# Patient Record
Sex: FEMALE | Race: OTHER | HISPANIC OR LATINO | URBAN - METROPOLITAN AREA
[De-identification: names, ages, dates, MRNs, and addresses within clinical notes are randomized per-mention and may not be internally consistent; named-entity substitution may affect disease eponyms.]

---

## 2022-05-06 ENCOUNTER — OUTPATIENT (OUTPATIENT)
Dept: OUTPATIENT SERVICES | Facility: HOSPITAL | Age: 27
LOS: 1 days | End: 2022-05-06
Payer: COMMERCIAL

## 2022-05-06 VITALS
DIASTOLIC BLOOD PRESSURE: 65 MMHG | HEART RATE: 90 BPM | SYSTOLIC BLOOD PRESSURE: 115 MMHG | RESPIRATION RATE: 18 BRPM | TEMPERATURE: 98 F

## 2022-05-06 VITALS — HEART RATE: 80 BPM | DIASTOLIC BLOOD PRESSURE: 63 MMHG | SYSTOLIC BLOOD PRESSURE: 112 MMHG | OXYGEN SATURATION: 100 %

## 2022-05-06 DIAGNOSIS — O26.899 OTHER SPECIFIED PREGNANCY RELATED CONDITIONS, UNSPECIFIED TRIMESTER: ICD-10-CM

## 2022-05-06 DIAGNOSIS — Z3A.00 WEEKS OF GESTATION OF PREGNANCY NOT SPECIFIED: ICD-10-CM

## 2022-05-06 LAB
APPEARANCE UR: ABNORMAL
BACTERIA # UR AUTO: ABNORMAL
BILIRUB UR-MCNC: NEGATIVE — SIGNIFICANT CHANGE UP
COLOR SPEC: ABNORMAL
COMMENT - URINE: SIGNIFICANT CHANGE UP
DIFF PNL FLD: NEGATIVE — SIGNIFICANT CHANGE UP
EPI CELLS # UR: 12 /HPF — HIGH
GLUCOSE UR QL: NEGATIVE — SIGNIFICANT CHANGE UP
HYALINE CASTS # UR AUTO: 2 /LPF — SIGNIFICANT CHANGE UP (ref 0–2)
KETONES UR-MCNC: NEGATIVE — SIGNIFICANT CHANGE UP
LEUKOCYTE ESTERASE UR-ACNC: NEGATIVE — SIGNIFICANT CHANGE UP
NITRITE UR-MCNC: NEGATIVE — SIGNIFICANT CHANGE UP
PH UR: 7 — SIGNIFICANT CHANGE UP (ref 5–8)
PROT UR-MCNC: ABNORMAL
RBC CASTS # UR COMP ASSIST: 2 /HPF — SIGNIFICANT CHANGE UP (ref 0–4)
SP GR SPEC: 1.02 — SIGNIFICANT CHANGE UP (ref 1.01–1.02)
UROBILINOGEN FLD QL: NEGATIVE — SIGNIFICANT CHANGE UP
WBC UR QL: 5 /HPF — SIGNIFICANT CHANGE UP (ref 0–5)

## 2022-05-06 PROCEDURE — 81001 URINALYSIS AUTO W/SCOPE: CPT

## 2022-05-06 PROCEDURE — 99213 OFFICE O/P EST LOW 20 MIN: CPT

## 2022-05-06 PROCEDURE — 59025 FETAL NON-STRESS TEST: CPT

## 2022-05-06 PROCEDURE — 87086 URINE CULTURE/COLONY COUNT: CPT

## 2022-05-06 PROCEDURE — G0463: CPT

## 2022-05-06 RX ORDER — SODIUM CHLORIDE 9 MG/ML
1000 INJECTION, SOLUTION INTRAVENOUS
Refills: 0 | Status: DISCONTINUED | OUTPATIENT
Start: 2022-05-06 | End: 2022-05-20

## 2022-05-06 NOTE — OB PROVIDER TRIAGE NOTE - NSHPPHYSICALEXAM_GEN_ALL_CORE
VS:  Vital Signs Last 24 Hrs  T(C): --  T(F): --  HR: 80 (06 May 2022 15:37) (80 - 107)  BP: 112/63 (06 May 2022 15:37) (112/63 - 112/63)  BP(mean): --  RR: --  SpO2: 100% (06 May 2022 15:37) (87% - 100%  GA: NAD  Cards: RRR  Pulm: CTAB  EFH: reactive, reassuring  [place]: not xiomara  VE: 0/0/-3  Speculum: No bleeding noted in the vaginal vault and no bleeding noted from the cervical os

## 2022-05-06 NOTE — OB PROVIDER TRIAGE NOTE - HISTORY OF PRESENT ILLNESS
Pt is a 26y/o  at 23+1 who presents to triage complaining of left lower quadrant pain for one day duration. Patient also endorses concomitant occasional suprapubic spams. States that the pain is constant in nature. Denies any urinary symptoms--no increase in urinary frequency and no dysuria. Patient denies fever, chills, or any episodes of emesis. Patient denies any changes in her bowel habits and endorses eating and drinking normally over the course of the day. Patient denies any leakage of fluid. Endorses an episode of spotting one week prior.   Prenatal course c/b placental hematoma, patient endorses on episode of spotting one week ago. Patient follows with Dr. Edmond in [place] for PNC; however, due to insurance issues is no longer able to follow with him. Patient plans on establishing OBGYN when she gets her insurance figured out    OBHx:   6lb 2oz  GynHx: +remote history of abnormal paps, denies cysts, fibroids, STIs  PMHx: Sciatica  PSHx: Excess skin removal (), Spinal laminectomy L4-L5 (2020)  Med: PNV  All: NKDA  SH: Denies toxic habits x3, no depression, no anxiety

## 2022-05-06 NOTE — OB RN TRIAGE NOTE - NS_OBGYNHISTORY_OBGYN_ALL_OB_FT
x1  recurrent UTI since last delivery  Spinal Laminectomy (l4-l5)  excess abdominal skin removal from lower abd

## 2022-05-06 NOTE — OB PROVIDER TRIAGE NOTE - NSOBPROVIDERNOTE_OBGYN_ALL_OB_FT
Patient presents to triage complaining of LLQ pain for one day duration. Patient endorses that the pain is constant.     -UA normal  -FHR: Reassuring  -0/0/-3 on VE, patient unlikely to be in  labor at this time  -no concern for vaginal bleeding at this time  -patient given return precautions (Cramping, leakage of fluid, vaginal bleeding)  -patient instructed to follow up     discussed w dr jernigan (note delayed 2/2 clinical duties)  keena segovia pgy-1

## 2022-05-08 LAB
CULTURE RESULTS: SIGNIFICANT CHANGE UP
SPECIMEN SOURCE: SIGNIFICANT CHANGE UP

## 2022-08-03 ENCOUNTER — INPATIENT (INPATIENT)
Facility: HOSPITAL | Age: 27
LOS: 0 days | Discharge: HOME | DRG: 833 | End: 2022-08-04
Attending: OBSTETRICS & GYNECOLOGY | Admitting: OBSTETRICS & GYNECOLOGY
Payer: COMMERCIAL

## 2022-08-03 VITALS
TEMPERATURE: 98 F | RESPIRATION RATE: 16 BRPM | SYSTOLIC BLOOD PRESSURE: 114 MMHG | HEART RATE: 90 BPM | DIASTOLIC BLOOD PRESSURE: 78 MMHG

## 2022-08-03 DIAGNOSIS — O47.1 FALSE LABOR AT OR AFTER 37 COMPLETED WEEKS OF GESTATION: ICD-10-CM

## 2022-08-03 NOTE — OB RN TRIAGE NOTE - FALL HARM RISK - UNIVERSAL INTERVENTIONS
Bed in lowest position, wheels locked, appropriate side rails in place/Call bell, personal items and telephone in reach/Instruct patient to call for assistance before getting out of bed or chair/Non-slip footwear when patient is out of bed/[place] to call system/Physically safe environment - no spills, clutter or unnecessary equipment/Purposeful Proactive Rounding/Room/bathroom lighting operational, light cord in reach

## 2022-08-03 NOTE — OB RN TRIAGE NOTE - NS_FETALHEARTRATE_OBGYN_ALL_OB_FT
Let pt know that her ferritin level is ok at 56 therefore I would like her to take a gabapentin dose 1-2 hours prior to bedtime to see if this helps her RLS symptoms.     125

## 2022-08-04 ENCOUNTER — TRANSCRIPTION ENCOUNTER (OUTPATIENT)
Age: 27
End: 2022-08-04

## 2022-08-04 VITALS — SYSTOLIC BLOOD PRESSURE: 122 MMHG | DIASTOLIC BLOOD PRESSURE: 71 MMHG | HEART RATE: 86 BPM

## 2022-08-04 DIAGNOSIS — Z98.890 OTHER SPECIFIED POSTPROCEDURAL STATES: Chronic | ICD-10-CM

## 2022-08-04 DIAGNOSIS — O26.893 OTHER SPECIFIED PREGNANCY RELATED CONDITIONS, THIRD TRIMESTER: ICD-10-CM

## 2022-08-04 LAB
BASOPHILS # BLD AUTO: 0.02 K/UL — SIGNIFICANT CHANGE UP (ref 0–0.2)
BASOPHILS NFR BLD AUTO: 0.2 % — SIGNIFICANT CHANGE UP (ref 0–2)
BLD GP AB SCN SERPL QL: SIGNIFICANT CHANGE UP
COVID-19 SPIKE DOMAIN AB INTERP: POSITIVE
COVID-19 SPIKE DOMAIN ANTIBODY RESULT: >250 U/ML — HIGH
EOSINOPHIL # BLD AUTO: 0.01 K/UL — SIGNIFICANT CHANGE UP (ref 0–0.5)
EOSINOPHIL NFR BLD AUTO: 0.1 % — SIGNIFICANT CHANGE UP (ref 0–6)
HCT VFR BLD CALC: 34.9 % — SIGNIFICANT CHANGE UP (ref 34.5–45)
HGB BLD-MCNC: 11.3 G/DL — LOW (ref 11.5–15.5)
IMM GRANULOCYTES NFR BLD AUTO: 0.7 % — SIGNIFICANT CHANGE UP (ref 0–1.5)
LYMPHOCYTES # BLD AUTO: 2.2 K/UL — SIGNIFICANT CHANGE UP (ref 1–3.3)
LYMPHOCYTES # BLD AUTO: 25.6 % — SIGNIFICANT CHANGE UP (ref 13–44)
MCHC RBC-ENTMCNC: 27.9 PG — SIGNIFICANT CHANGE UP (ref 27–34)
MCHC RBC-ENTMCNC: 32.4 GM/DL — SIGNIFICANT CHANGE UP (ref 32–36)
MCV RBC AUTO: 86.2 FL — SIGNIFICANT CHANGE UP (ref 80–100)
MONOCYTES # BLD AUTO: 0.59 K/UL — SIGNIFICANT CHANGE UP (ref 0–0.9)
MONOCYTES NFR BLD AUTO: 6.9 % — SIGNIFICANT CHANGE UP (ref 2–14)
NEUTROPHILS # BLD AUTO: 5.72 K/UL — SIGNIFICANT CHANGE UP (ref 1.8–7.4)
NEUTROPHILS NFR BLD AUTO: 66.5 % — SIGNIFICANT CHANGE UP (ref 43–77)
PLATELET # BLD AUTO: 195 K/UL — SIGNIFICANT CHANGE UP (ref 150–400)
RBC # BLD: 4.05 M/UL — SIGNIFICANT CHANGE UP (ref 3.8–5.2)
RBC # FLD: 13.6 % — SIGNIFICANT CHANGE UP (ref 10.3–14.5)
SARS-COV-2 IGG+IGM SERPL QL IA: >250 U/ML — HIGH
SARS-COV-2 IGG+IGM SERPL QL IA: POSITIVE
SARS-COV-2 RNA SPEC QL NAA+PROBE: DETECTED
T PALLIDUM AB TITR SER: NEGATIVE — SIGNIFICANT CHANGE UP
WBC # BLD: 8.6 K/UL — SIGNIFICANT CHANGE UP (ref 3.8–10.5)
WBC # FLD AUTO: 8.6 K/UL — SIGNIFICANT CHANGE UP (ref 3.8–10.5)

## 2022-08-04 PROCEDURE — 99213 OFFICE O/P EST LOW 20 MIN: CPT

## 2022-08-04 RX ORDER — CITRIC ACID/SODIUM CITRATE 300-500 MG
30 SOLUTION, ORAL ORAL ONCE
Refills: 0 | Status: COMPLETED | OUTPATIENT
Start: 2022-08-04 | End: 2022-08-04

## 2022-08-04 RX ORDER — SODIUM CHLORIDE 9 MG/ML
1000 INJECTION, SOLUTION INTRAVENOUS
Refills: 0 | Status: DISCONTINUED | OUTPATIENT
Start: 2022-08-04 | End: 2022-08-04

## 2022-08-04 RX ORDER — FAMOTIDINE 10 MG/ML
20 INJECTION INTRAVENOUS ONCE
Refills: 0 | Status: COMPLETED | OUTPATIENT
Start: 2022-08-04 | End: 2022-08-04

## 2022-08-04 RX ORDER — AMPICILLIN TRIHYDRATE 250 MG
1 CAPSULE ORAL EVERY 4 HOURS
Refills: 0 | Status: DISCONTINUED | OUTPATIENT
Start: 2022-08-04 | End: 2022-08-04

## 2022-08-04 RX ORDER — AMPICILLIN TRIHYDRATE 250 MG
2 CAPSULE ORAL ONCE
Refills: 0 | Status: COMPLETED | OUTPATIENT
Start: 2022-08-04 | End: 2022-08-04

## 2022-08-04 RX ORDER — CHLORHEXIDINE GLUCONATE 213 G/1000ML
1 SOLUTION TOPICAL ONCE
Refills: 0 | Status: DISCONTINUED | OUTPATIENT
Start: 2022-08-04 | End: 2022-08-04

## 2022-08-04 RX ORDER — OXYTOCIN 10 UNIT/ML
333.33 VIAL (ML) INJECTION
Qty: 20 | Refills: 0 | Status: DISCONTINUED | OUTPATIENT
Start: 2022-08-04 | End: 2022-08-04

## 2022-08-04 RX ADMIN — FAMOTIDINE 20 MILLIGRAM(S): 10 INJECTION INTRAVENOUS at 06:01

## 2022-08-04 RX ADMIN — Medication 30 MILLILITER(S): at 03:49

## 2022-08-04 RX ADMIN — Medication 200 GRAM(S): at 04:35

## 2022-08-04 RX ADMIN — SODIUM CHLORIDE 125 MILLILITER(S): 9 INJECTION, SOLUTION INTRAVENOUS at 06:01

## 2022-08-04 NOTE — DISCHARGE NOTE ANTEPARTUM - PLAN OF CARE
Follow-up with your doctor in 1 week.   If experiencing LOF, heavy vaginal bleeding, decreased fetal movement or painful contractions less than 5 mins apart- lasting 1 hour, call you provider immediately or return to the hospital.

## 2022-08-04 NOTE — OB PROVIDER LABOR PROGRESS NOTE - ASSESSMENT
Cat 1 tracing   No significant cervical change appreciated   Will reassess in 2 hours  Cat 1 tracing   No significant cervical change appreciated   Will reassess in 2 hours     ATTENDING ADDENDUM:  Pt is a 26y/o  @ 36wks (YUN 2022)  pre-term contraction  -exam 2 cm unchanged per pgy 3 over many hours  -patient seen and appears very comfortable  -FWB Cat 1 reactive  -[place]: irregular  -next PNC visit tomorrow    d/c home w/ precautions    Dr. Gao  Covering Laborist

## 2022-08-04 NOTE — OB PROVIDER LABOR PROGRESS NOTE - NS_OBIHIFHRDETAILS_OBGYN_ALL_OB_FT
120 baseline, moderate variability, +accels, no decels
130 baseline, moderate variability, +accels, no decels

## 2022-08-04 NOTE — OB PROVIDER H&P - NSHPPHYSICALEXAM_GEN_ALL_CORE
ICU Vital Signs Last 24 Hrs  T(C): 36.8 (03 Aug 2022 23:53), Max: 36.8 (03 Aug 2022 23:53)  T(F): 98.2 (03 Aug 2022 23:53), Max: 98.2 (03 Aug 2022 23:53)  HR: 90 (04 Aug 2022 00:02) (90 - 90)  BP: 114/78 (04 Aug 2022 00:02) (114/78 - 114/78)  RR: 16 (03 Aug 2022 23:53) (16 - 16)      PE  GEN: Moderate discomfort w/ contraction pain  ABD: Firm during contractions, gravid  SVE: 2.0/50/-3  FHT: 120 bpm, moderate variability + accels no decels present   [place]: CTX q3min

## 2022-08-04 NOTE — OB PROVIDER LABOR PROGRESS NOTE - NS_SUBJECTIVE/OBJECTIVE_OBGYN_ALL_OB_FT
Pt examined at bedside. Reports last felt a contraction at 7:30, ctx at most painful are 7/10, but irregular.
Pt  reevaluated for cervical change

## 2022-08-04 NOTE — OB PROVIDER H&P - ASSESSMENT
Patient Name: Gadiel Benavides        : 9/15/1955       Medical Record #: [id number]    CONSENT FOR PROCEDURES/SEDATION    Date: 2020       Time: 2:01 PM        1.  I authorize the performance upon Gadiel Benavides the following:    Esophago Pt is a 26y/o  @ 36wks (YUN 2022) who presents to L&D w/ complaints of regular ctx pain; admin for  labor    PLAN:  - Admit to L&D  - Consent  - Admission labs  - GBS unknown. Culture obtained at bedside. Will start ampicillin ppx  - COVID-19 pending; asymptomatic at bedside  - Hx of  contractions, betamethasone complete @ 26wks.   - Reactive tracing, regular ctx at bedside  - Will c/w expectant mgmt. Pt does not desire pain meds at this time.   - Continuous toco/FHT  - Maternal/fetal status reassuring      D/w Dr. Dunbar

## 2022-08-04 NOTE — DISCHARGE NOTE ANTEPARTUM - HOSPITAL COURSE
Pt reevaluated for cervical change in the setting of  contractions. Exam remains unchanged. NST reactive. Stable for d/c home with follow-up with her provider with labor return precautions.

## 2022-08-04 NOTE — DISCHARGE NOTE ANTEPARTUM - PATIENT PORTAL LINK FT
You can access the FollowMyHealth Patient Portal offered by [facility] by registering at the following website: [email or web address]. By joining [employer]’s FollowMyHealth portal, you will also be able to view your health information using other applications (apps) compatible with our system.

## 2022-08-04 NOTE — OB PROVIDER H&P - HISTORY OF PRESENT ILLNESS
Pt is a 26y/o  @ 36wks (YUN 2022) who presents to L&D w/ complaints of regular ctx pain.    Patient states contractions began last night and are now occurring q5min. Reports 8/10 contraction like pain. Reports scant vaginal spotting. Otherwise reports good fetal movement. Denies any LOF or dysuria.     Pregnancy course complicated by:  -  contractions at 26wks, Pt hospitalized at [facility] @ 26wks - Received betamethasone x2 and MgSO4. Made no cervical change and discharged home.     OBHx:  (2016) - Female- 6lb 2oz  GynHx: +remote history of abnormal paps, denies cysts, fibroids, STIs  PMHx: Sciatica  PSHx: Excess skin removal (), Spinal laminectomy L4-L5 ()  Med: PNV  All: NKDA  SH: Denies toxic habits x3, no depression, no anxiety

## 2022-08-04 NOTE — DISCHARGE NOTE ANTEPARTUM - CARE PLAN
1 Principal Discharge DX:	Labor, false ([place]app), antepartum  Assessment and plan of treatment:	Follow-up with your doctor in 1 week.   If experiencing LOF, heavy vaginal bleeding, decreased fetal movement or painful contractions less than 5 mins apart- lasting 1 hour, call you provider immediately or return to the hospital.

## 2022-08-04 NOTE — OB PROVIDER LABOR PROGRESS NOTE - ASSESSMENT
Cat 1 tracing   No cervical change despite persistent contractions.   Pt not in active labor. Given GA- unable to augment.   Will d/c home. Labor precautions reviewed- pt expressed understanding. Pt's questions addressed

## 2022-08-05 LAB
GROUP B BETA STREP DNA (PCR): DETECTED
GROUP B BETA STREP INTERPRETATION: SIGNIFICANT CHANGE UP
SOURCE GROUP B STREP: SIGNIFICANT CHANGE UP

## 2022-08-06 PROCEDURE — 36415 COLL VENOUS BLD VENIPUNCTURE: CPT

## 2022-08-06 PROCEDURE — U0003: CPT

## 2022-08-06 PROCEDURE — 87653 STREP B DNA AMP PROBE: CPT

## 2022-08-06 PROCEDURE — 86780 TREPONEMA PALLIDUM: CPT

## 2022-08-06 PROCEDURE — 85025 COMPLETE CBC W/AUTO DIFF WBC: CPT

## 2022-08-06 PROCEDURE — 86769 SARS-COV-2 COVID-19 ANTIBODY: CPT

## 2022-08-06 PROCEDURE — 86850 RBC ANTIBODY SCREEN: CPT

## 2022-08-06 PROCEDURE — U0005: CPT

## 2022-08-06 PROCEDURE — 86900 BLOOD TYPING SEROLOGIC ABO: CPT

## 2022-08-06 PROCEDURE — 86901 BLOOD TYPING SEROLOGIC RH(D): CPT

## 2022-08-12 ENCOUNTER — TRANSCRIPTION ENCOUNTER (OUTPATIENT)
Age: 27
End: 2022-08-12

## 2022-08-12 ENCOUNTER — INPATIENT (INPATIENT)
Facility: HOSPITAL | Age: 27
LOS: 0 days | Discharge: HOME | End: 2022-08-13
Attending: OBSTETRICS & GYNECOLOGY | Admitting: OBSTETRICS & GYNECOLOGY
Payer: COMMERCIAL

## 2022-08-12 VITALS
DIASTOLIC BLOOD PRESSURE: 88 MMHG | TEMPERATURE: 98 F | SYSTOLIC BLOOD PRESSURE: 132 MMHG | RESPIRATION RATE: 17 BRPM | HEART RATE: 107 BPM

## 2022-08-12 DIAGNOSIS — Z98.890 OTHER SPECIFIED POSTPROCEDURAL STATES: Chronic | ICD-10-CM

## 2022-08-12 DIAGNOSIS — O47.1 FALSE LABOR AT OR AFTER 37 COMPLETED WEEKS OF GESTATION: ICD-10-CM

## 2022-08-12 LAB
BASOPHILS # BLD AUTO: 0.03 K/UL — SIGNIFICANT CHANGE UP (ref 0–0.2)
BASOPHILS NFR BLD AUTO: 0.3 % — SIGNIFICANT CHANGE UP (ref 0–2)
BLD GP AB SCN SERPL QL: SIGNIFICANT CHANGE UP
COVID-19 SPIKE DOMAIN AB INTERP: POSITIVE
COVID-19 SPIKE DOMAIN ANTIBODY RESULT: >250 U/ML — HIGH
EOSINOPHIL # BLD AUTO: 0.04 K/UL — SIGNIFICANT CHANGE UP (ref 0–0.5)
EOSINOPHIL NFR BLD AUTO: 0.4 % — SIGNIFICANT CHANGE UP (ref 0–6)
HBV SURFACE AG SERPL QL IA: SIGNIFICANT CHANGE UP
HCT VFR BLD CALC: 35.3 % — SIGNIFICANT CHANGE UP (ref 34.5–45)
HGB BLD-MCNC: 11.5 G/DL — SIGNIFICANT CHANGE UP (ref 11.5–15.5)
HIV 1 & 2 AB SERPL IA.RAPID: SIGNIFICANT CHANGE UP
HIV 1+2 AB+HIV1 P24 AG SERPL QL IA: SIGNIFICANT CHANGE UP
IMM GRANULOCYTES NFR BLD AUTO: 0.6 % — SIGNIFICANT CHANGE UP (ref 0–1.5)
LYMPHOCYTES # BLD AUTO: 1.91 K/UL — SIGNIFICANT CHANGE UP (ref 1–3.3)
LYMPHOCYTES # BLD AUTO: 17.4 % — SIGNIFICANT CHANGE UP (ref 13–44)
MCHC RBC-ENTMCNC: 28.7 PG — SIGNIFICANT CHANGE UP (ref 27–34)
MCHC RBC-ENTMCNC: 32.6 GM/DL — SIGNIFICANT CHANGE UP (ref 32–36)
MCV RBC AUTO: 88 FL — SIGNIFICANT CHANGE UP (ref 80–100)
MONOCYTES # BLD AUTO: 0.85 K/UL — SIGNIFICANT CHANGE UP (ref 0–0.9)
MONOCYTES NFR BLD AUTO: 7.8 % — SIGNIFICANT CHANGE UP (ref 2–14)
NEUTROPHILS # BLD AUTO: 8.05 K/UL — HIGH (ref 1.8–7.4)
NEUTROPHILS NFR BLD AUTO: 73.5 % — SIGNIFICANT CHANGE UP (ref 43–77)
PLATELET # BLD AUTO: 230 K/UL — SIGNIFICANT CHANGE UP (ref 150–400)
RBC # BLD: 4.01 M/UL — SIGNIFICANT CHANGE UP (ref 3.8–5.2)
RBC # FLD: 14.3 % — SIGNIFICANT CHANGE UP (ref 10.3–14.5)
SARS-COV-2 IGG+IGM SERPL QL IA: >250 U/ML — HIGH
SARS-COV-2 IGG+IGM SERPL QL IA: POSITIVE
T PALLIDUM AB TITR SER: NEGATIVE — SIGNIFICANT CHANGE UP
WBC # BLD: 10.95 K/UL — HIGH (ref 3.8–10.5)
WBC # FLD AUTO: 10.95 K/UL — HIGH (ref 3.8–10.5)

## 2022-08-12 RX ORDER — CITRIC ACID/SODIUM CITRATE 300-500 MG
15 SOLUTION, ORAL ORAL EVERY 6 HOURS
Refills: 0 | Status: DISCONTINUED | OUTPATIENT
Start: 2022-08-12 | End: 2022-08-12

## 2022-08-12 RX ORDER — PRAMOXINE HYDROCHLORIDE 150 MG/15G
1 AEROSOL, FOAM RECTAL EVERY 4 HOURS
Refills: 0 | Status: DISCONTINUED | OUTPATIENT
Start: 2022-08-12 | End: 2022-08-13

## 2022-08-12 RX ORDER — LANOLIN
1 OINTMENT (GRAM) TOPICAL EVERY 6 HOURS
Refills: 0 | Status: DISCONTINUED | OUTPATIENT
Start: 2022-08-12 | End: 2022-08-13

## 2022-08-12 RX ORDER — OXYTOCIN 10 UNIT/ML
VIAL (ML) INJECTION
Qty: 30 | Refills: 0 | Status: DISCONTINUED | OUTPATIENT
Start: 2022-08-12 | End: 2022-08-12

## 2022-08-12 RX ORDER — KETOROLAC TROMETHAMINE 30 MG/ML
30 SYRINGE (ML) INJECTION ONCE
Refills: 0 | Status: DISCONTINUED | OUTPATIENT
Start: 2022-08-12 | End: 2022-08-12

## 2022-08-12 RX ORDER — CHLORHEXIDINE GLUCONATE 213 G/1000ML
1 SOLUTION TOPICAL ONCE
Refills: 0 | Status: DISCONTINUED | OUTPATIENT
Start: 2022-08-12 | End: 2022-08-12

## 2022-08-12 RX ORDER — AER TRAVELER 0.5 G/1
1 SOLUTION RECTAL; TOPICAL EVERY 4 HOURS
Refills: 0 | Status: DISCONTINUED | OUTPATIENT
Start: 2022-08-12 | End: 2022-08-13

## 2022-08-12 RX ORDER — IBUPROFEN 200 MG
600 TABLET ORAL EVERY 6 HOURS
Refills: 0 | Status: COMPLETED | OUTPATIENT
Start: 2022-08-12 | End: 2023-07-11

## 2022-08-12 RX ORDER — BENZOCAINE 10 %
1 GEL (GRAM) MUCOUS MEMBRANE EVERY 6 HOURS
Refills: 0 | Status: DISCONTINUED | OUTPATIENT
Start: 2022-08-12 | End: 2022-08-13

## 2022-08-12 RX ORDER — OXYCODONE HYDROCHLORIDE 5 MG/1
5 TABLET ORAL
Refills: 0 | Status: DISCONTINUED | OUTPATIENT
Start: 2022-08-12 | End: 2022-08-13

## 2022-08-12 RX ORDER — HYDROCORTISONE 1 %
1 OINTMENT (GRAM) TOPICAL EVERY 6 HOURS
Refills: 0 | Status: DISCONTINUED | OUTPATIENT
Start: 2022-08-12 | End: 2022-08-13

## 2022-08-12 RX ORDER — TETANUS TOXOID, REDUCED DIPHTHERIA TOXOID AND ACELLULAR PERTUSSIS VACCINE, ADSORBED 5; 2.5; 8; 8; 2.5 [IU]/.5ML; [IU]/.5ML; UG/.5ML; UG/.5ML; UG/.5ML
0.5 SUSPENSION INTRAMUSCULAR ONCE
Refills: 0 | Status: DISCONTINUED | OUTPATIENT
Start: 2022-08-12 | End: 2022-08-13

## 2022-08-12 RX ORDER — ACETAMINOPHEN 500 MG
975 TABLET ORAL
Refills: 0 | Status: DISCONTINUED | OUTPATIENT
Start: 2022-08-12 | End: 2022-08-13

## 2022-08-12 RX ORDER — SODIUM CHLORIDE 9 MG/ML
1000 INJECTION, SOLUTION INTRAVENOUS ONCE
Refills: 0 | Status: COMPLETED | OUTPATIENT
Start: 2022-08-12 | End: 2022-08-12

## 2022-08-12 RX ORDER — DIBUCAINE 1 %
1 OINTMENT (GRAM) RECTAL EVERY 6 HOURS
Refills: 0 | Status: DISCONTINUED | OUTPATIENT
Start: 2022-08-12 | End: 2022-08-13

## 2022-08-12 RX ORDER — FAMOTIDINE 10 MG/ML
20 INJECTION INTRAVENOUS ONCE
Refills: 0 | Status: COMPLETED | OUTPATIENT
Start: 2022-08-12 | End: 2022-08-12

## 2022-08-12 RX ORDER — SODIUM CHLORIDE 9 MG/ML
1000 INJECTION, SOLUTION INTRAVENOUS
Refills: 0 | Status: DISCONTINUED | OUTPATIENT
Start: 2022-08-12 | End: 2022-08-12

## 2022-08-12 RX ORDER — OXYTOCIN 10 UNIT/ML
333.33 VIAL (ML) INJECTION
Qty: 20 | Refills: 0 | Status: COMPLETED | OUTPATIENT
Start: 2022-08-12 | End: 2022-08-12

## 2022-08-12 RX ORDER — SODIUM CHLORIDE 9 MG/ML
3 INJECTION INTRAMUSCULAR; INTRAVENOUS; SUBCUTANEOUS EVERY 8 HOURS
Refills: 0 | Status: DISCONTINUED | OUTPATIENT
Start: 2022-08-12 | End: 2022-08-13

## 2022-08-12 RX ORDER — MAGNESIUM HYDROXIDE 400 MG/1
30 TABLET, CHEWABLE ORAL
Refills: 0 | Status: DISCONTINUED | OUTPATIENT
Start: 2022-08-12 | End: 2022-08-13

## 2022-08-12 RX ORDER — DIPHENHYDRAMINE HCL 50 MG
25 CAPSULE ORAL EVERY 6 HOURS
Refills: 0 | Status: DISCONTINUED | OUTPATIENT
Start: 2022-08-12 | End: 2022-08-13

## 2022-08-12 RX ORDER — SIMETHICONE 80 MG/1
80 TABLET, CHEWABLE ORAL EVERY 4 HOURS
Refills: 0 | Status: DISCONTINUED | OUTPATIENT
Start: 2022-08-12 | End: 2022-08-13

## 2022-08-12 RX ORDER — CITRIC ACID/SODIUM CITRATE 300-500 MG
30 SOLUTION, ORAL ORAL ONCE
Refills: 0 | Status: DISCONTINUED | OUTPATIENT
Start: 2022-08-12 | End: 2022-08-13

## 2022-08-12 RX ORDER — AMPICILLIN TRIHYDRATE 250 MG
2 CAPSULE ORAL ONCE
Refills: 0 | Status: COMPLETED | OUTPATIENT
Start: 2022-08-12 | End: 2022-08-12

## 2022-08-12 RX ORDER — OXYCODONE HYDROCHLORIDE 5 MG/1
5 TABLET ORAL ONCE
Refills: 0 | Status: DISCONTINUED | OUTPATIENT
Start: 2022-08-12 | End: 2022-08-13

## 2022-08-12 RX ORDER — AMPICILLIN TRIHYDRATE 250 MG
1 CAPSULE ORAL EVERY 4 HOURS
Refills: 0 | Status: DISCONTINUED | OUTPATIENT
Start: 2022-08-12 | End: 2022-08-12

## 2022-08-12 RX ORDER — OXYTOCIN 10 UNIT/ML
333.33 VIAL (ML) INJECTION
Qty: 20 | Refills: 0 | Status: DISCONTINUED | OUTPATIENT
Start: 2022-08-12 | End: 2022-08-13

## 2022-08-12 RX ORDER — IBUPROFEN 200 MG
600 TABLET ORAL EVERY 6 HOURS
Refills: 0 | Status: DISCONTINUED | OUTPATIENT
Start: 2022-08-12 | End: 2022-08-13

## 2022-08-12 RX ADMIN — Medication 30 MILLIGRAM(S): at 17:17

## 2022-08-12 RX ADMIN — SODIUM CHLORIDE 125 MILLILITER(S): 9 INJECTION, SOLUTION INTRAVENOUS at 12:45

## 2022-08-12 RX ADMIN — Medication 200 GRAM(S): at 09:20

## 2022-08-12 RX ADMIN — FAMOTIDINE 20 MILLIGRAM(S): 10 INJECTION INTRAVENOUS at 16:12

## 2022-08-12 RX ADMIN — Medication 2 MILLIUNIT(S)/MIN: at 12:54

## 2022-08-12 RX ADMIN — Medication 1000 MILLIUNIT(S)/MIN: at 16:23

## 2022-08-12 RX ADMIN — SODIUM CHLORIDE 1000 MILLILITER(S): 9 INJECTION, SOLUTION INTRAVENOUS at 13:53

## 2022-08-12 RX ADMIN — Medication 975 MILLIGRAM(S): at 21:27

## 2022-08-12 RX ADMIN — SODIUM CHLORIDE 3 MILLILITER(S): 9 INJECTION INTRAMUSCULAR; INTRAVENOUS; SUBCUTANEOUS at 15:03

## 2022-08-12 RX ADMIN — Medication 108 GRAM(S): at 13:14

## 2022-08-12 NOTE — OB RN PATIENT PROFILE - FALL HARM RISK - UNIVERSAL INTERVENTIONS
Bed in lowest position, wheels locked, appropriate side rails in place/Call bell, personal items and telephone in reach/Instruct patient to call for assistance before getting out of bed or chair/Non-slip footwear when patient is out of bed/[place] to call system/Physically safe environment - no spills, clutter or unnecessary equipment/Purposeful Proactive Rounding/Room/bathroom lighting operational, light cord in reach

## 2022-08-12 NOTE — OB RN DELIVERY SUMMARY - NSSELHIDDEN_OBGYN_ALL_OB_FT
[NS_DeliveryAttending1_OBGYN_ALL_OB_FT:[id number]=],[NS_DeliveryAssist1_OBGYN_ALL_OB_FT:[id number]=],[NS_DeliveryRN_OBGYN_ALL_OB_FT:[id number]=]

## 2022-08-12 NOTE — OB PROVIDER DELIVERY SUMMARY - NSPROVIDERDELIVERYNOTE_OBGYN_ALL_OB_FT
at 4:23pm of a live female, and Apgars 9/9. Delivered ANGELIA, nuchal cord x1, clear fluid. Infant's head delivered with maternal expulsive efforts. Shoulders delivered without difficulty followed by the rest of the body. Nose and mouth were bulb suctioned. Cord clamped and cut after delay. Samples obtained. Baby handed to patient. Placenta delivered spontaneously, intact, 3VC. Fundus firm, minimal bleeding. Perineum and vagina inspected – small bilateral labial lacerations repaired with chromic. EBL 58cc. Hemostasis noted. Pt tolerated procedure well, in stable condition, recovering in LDR. Infant in LDR. Instrument/sponge count correct x 2 and confirmed by nurse.

## 2022-08-12 NOTE — OB PROVIDER LABOR PROGRESS NOTE - NS_SUBJECTIVE/OBJECTIVE_OBGYN_ALL_OB_FT
Patient re-examined at bedside. Reports feeling contractions every 10 minutes.
Patient re-examined at bedside. Reports feeling increased pain with contractions. Requesting an epidural.

## 2022-08-12 NOTE — OB PROVIDER LABOR PROGRESS NOTE - ASSESSMENT
-VSS  -giving IVF bolus in preparation for epidural  -Will continue augmentation with Pitocin, currently at 4  -will re-assess after epidural is placed
-VSS  -Will start augmenting labor with Pitocin  -Will re-asses as needed

## 2022-08-12 NOTE — OB RN DELIVERY SUMMARY - NS_SEPSISRSKCALC_OBGYN_ALL_OB_FT
EOS calculated successfully. EOS Risk Factor: 0.5/1000 live births ([facility] national incidence); GA=37w1d; Temp=98.24; ROM=11.383; GBS='Positive'; Antibiotics='GBS specific antibiotics > 2 hrs prior to birth'

## 2022-08-12 NOTE — OB PROVIDER DELIVERY SUMMARY - NSSELHIDDEN_OBGYN_ALL_OB_FT
[NS_DeliveryAttending1_OBGYN_ALL_OB_FT:[id number]],[NS_DeliveryAssist1_OBGYN_ALL_OB_FT:[id number]=]

## 2022-08-12 NOTE — OB RN DELIVERY SUMMARY - NS_LABORCHARACTER_OBGYN_ALL_OB
External electronic FM/Antibiotics in labor Augmentation of labor/External electronic FM/Antibiotics in labor

## 2022-08-12 NOTE — OB PROVIDER H&P - NS_OBGYNHISTORY_OBGYN_ALL_OB_FT
NSVDx1  hospitalized at [facility] for PTL during this pregnancy, she received BMZ

## 2022-08-13 VITALS
HEART RATE: 84 BPM | OXYGEN SATURATION: 98 % | DIASTOLIC BLOOD PRESSURE: 67 MMHG | SYSTOLIC BLOOD PRESSURE: 103 MMHG | TEMPERATURE: 98 F | RESPIRATION RATE: 18 BRPM

## 2022-08-13 LAB
HCT VFR BLD CALC: 36.8 % — SIGNIFICANT CHANGE UP (ref 34.5–45)
HGB BLD-MCNC: 11.9 G/DL — SIGNIFICANT CHANGE UP (ref 11.5–15.5)
MEV IGG SER-ACNC: 35.1 AU/ML — SIGNIFICANT CHANGE UP
MEV IGG+IGM SER-IMP: POSITIVE — SIGNIFICANT CHANGE UP
RUBV IGG SER-ACNC: 0.6 INDEX — SIGNIFICANT CHANGE UP
RUBV IGG SER-IMP: NEGATIVE — SIGNIFICANT CHANGE UP

## 2022-08-13 PROCEDURE — 86780 TREPONEMA PALLIDUM: CPT

## 2022-08-13 PROCEDURE — 85018 HEMOGLOBIN: CPT

## 2022-08-13 PROCEDURE — 86703 HIV-1/HIV-2 1 RESULT ANTBDY: CPT

## 2022-08-13 PROCEDURE — 85025 COMPLETE CBC W/AUTO DIFF WBC: CPT

## 2022-08-13 PROCEDURE — 86762 RUBELLA ANTIBODY: CPT

## 2022-08-13 PROCEDURE — 86769 SARS-COV-2 COVID-19 ANTIBODY: CPT

## 2022-08-13 PROCEDURE — 86765 RUBEOLA ANTIBODY: CPT

## 2022-08-13 PROCEDURE — 87340 HEPATITIS B SURFACE AG IA: CPT

## 2022-08-13 PROCEDURE — 86900 BLOOD TYPING SEROLOGIC ABO: CPT

## 2022-08-13 PROCEDURE — 59050 FETAL MONITOR W/REPORT: CPT

## 2022-08-13 PROCEDURE — 86850 RBC ANTIBODY SCREEN: CPT

## 2022-08-13 PROCEDURE — 59025 FETAL NON-STRESS TEST: CPT

## 2022-08-13 PROCEDURE — 86901 BLOOD TYPING SEROLOGIC RH(D): CPT

## 2022-08-13 PROCEDURE — 87389 HIV-1 AG W/HIV-1&-2 AB AG IA: CPT

## 2022-08-13 PROCEDURE — 83986 ASSAY PH BODY FLUID NOS: CPT

## 2022-08-13 PROCEDURE — G0463: CPT

## 2022-08-13 PROCEDURE — 36415 COLL VENOUS BLD VENIPUNCTURE: CPT

## 2022-08-13 PROCEDURE — 85014 HEMATOCRIT: CPT

## 2022-08-13 RX ORDER — IBUPROFEN 200 MG
1 TABLET ORAL
Qty: 30 | Refills: 0
Start: 2022-08-13

## 2022-08-13 RX ORDER — ACETAMINOPHEN 500 MG
1 TABLET ORAL
Qty: 30 | Refills: 0
Start: 2022-08-13 | End: 2022-08-22

## 2022-08-13 RX ADMIN — Medication 975 MILLIGRAM(S): at 03:32

## 2022-08-13 RX ADMIN — SODIUM CHLORIDE 3 MILLILITER(S): 9 INJECTION INTRAMUSCULAR; INTRAVENOUS; SUBCUTANEOUS at 02:04

## 2022-08-13 RX ADMIN — Medication 975 MILLIGRAM(S): at 09:01

## 2022-08-13 RX ADMIN — Medication 975 MILLIGRAM(S): at 14:17

## 2022-08-13 RX ADMIN — Medication 600 MILLIGRAM(S): at 00:42

## 2022-08-13 RX ADMIN — Medication 1 TABLET(S): at 14:18

## 2022-08-13 RX ADMIN — Medication 600 MILLIGRAM(S): at 05:36

## 2022-08-13 NOTE — PROGRESS NOTE ADULT - SUBJECTIVE AND OBJECTIVE BOX
LUL MUÑIZ is a 27y  now post partum day 1 s/p uncomplicated  @ 37w1d for labor; asymptomatic COV+    SUBJECTIVE:  No acute events overnight, patient has no complaints.  Pain is well controlled with current treatment regimen.  Ambulating and tolerating PO.   + flatus, - BM, + voiding.  Appropriate lochia, which is decreasing.  Breastfeeding without difficulty.  Denies fever, chills, nausea, vomiting, cough, sore throat.  She denies lightheadedness, dizziness, HA, blurry vision, palpitations, chest pain and SOB.     OBJECTIVE:  Physical exam:  General: AOx3, NAD.  Heart: RRR  Lungs: CTAB  Abdomen: Soft, appropriately tender to palpitation, uterus firm with fundus @ umbilicus.  Vaginal: minimal blood on pad, no bleeding on palpation of fundus  Ext: No DVT signs, warm extremities.    Vital Signs Last 24 Hrs  T(C): 36.8 (12 Aug 2022 18:27), Max: 36.8 (12 Aug 2022 07:36)  T(F): 98.3 (12 Aug 2022 18:27), Max: 98.3 (12 Aug 2022 18:27)  HR: 81 (12 Aug 2022 18:27) (73 - 126)  BP: 113/75 (12 Aug 2022 18:27) (100/61 - 155/71)  RR: 18 (12 Aug 2022 18:27) (17 - 18)  SpO2: 98% (12 Aug 2022 18:27) (94% - 100%)        LABS:                        11.5   10.95 )-----------( 230      ( 12 Aug 2022 09:30 )             35.3

## 2022-08-13 NOTE — DISCHARGE NOTE OB - NS MD DC FALL RISK RISK
For information on Fall & Injury Prevention, visit: [email or web address] OR  [email or web address] OR  https://www.cdc.gov/steadi/patient.html

## 2022-08-13 NOTE — DISCHARGE NOTE OB - CARE PLAN
Principal Discharge DX:	 (normal spontaneous vaginal delivery)  Assessment and plan of treatment:	Patient should transition to regular activity level. Resume regular diet. Patient should follow up with her OB for a postpartum checkup 4-6 weeks after delivery. Patient should call her doctor sooner if she develops a fever or uncontrolled vaginal bleeding. Please call sooner if there are any other concerns.  Secondary Diagnosis:	COVID-19 affecting pregnancy in third trimester  Assessment and plan of treatment:	Asymptomatic   1

## 2022-08-13 NOTE — DISCHARGE NOTE OB - CARE PROVIDER_API CALL
Bright White)  Obstetrics and Gynecology  [address], [address]  [place] [address]  Phone: [phone]  Fax: [phone]  Follow Up Time:

## 2022-08-13 NOTE — DISCHARGE NOTE OB - PATIENT PORTAL LINK FT
You can access the FollowMyHealth Patient Portal offered by [facility] by registering at the following website: [email or web address]. By joining "[employer]’s FollowMyHealth portal, you will also be able to view your health information using other applications (apps) compatible with our system.

## 2022-08-13 NOTE — PROGRESS NOTE ADULT - ASSESSMENT
A/P: LUL MUÑIZ is a 27y  now post partum day 1 s/p uncomplicated  @ 37w1d for labor; asymptomatic COV+    # COV+  - Afebrile  - Continues to be asx    #Routine post partum care  - BP O/N 113/75  - Stable, doing well postpartum  - Hgb 11.5 > pending  - Pain: well controlled on PO pain meds  - GI: regular diet, normal bowel function  - : voiding, lochia decreasing   - DVT ppx: SCDs, ambulation encouraged  - Healthy baby girl, breastfeeding  - Pending MMR  - Dispo: for discharge today or tomorrow pending attending approval

## 2022-08-13 NOTE — DISCHARGE NOTE OB - HOSPITAL COURSE
27 y.o  s/p  @ 37w1d after presenting in labor.  She had a normal postpartum course and was discharged home in stable condition on postpartum day 1. COVID-19 positive, asymptomatic.                          11.5   10.95 )-----------( 230      ( 12 Aug 2022 09:30 )             35.3

## 2022-08-13 NOTE — DISCHARGE NOTE OB - MATERIALS PROVIDED
[facility] Screening Program/Breastfeeding Log/Breastfeeding Mother’s Support Group Information/Guide to Postpartum Care/Back To Sleep Handout/Shaken Baby Prevention Handout/Breastfeeding Guide and Packet/Birth Certificate Instructions/MMR Vaccination (VIS Pub Date: 2012)/Tdap Vaccination (VIS Pub Date: 2012)

## 2022-09-22 NOTE — OB RN TRIAGE NOTE - CURRENT PREGNANCY COMPLICATIONS, OB PROFILE
Outreach attempt was made to schedule a Medicare Wellness Visit. This was the first attempt. Contact was made, MWV appointment scheduled.    Labor

## 2023-01-17 PROBLEM — Z00.00 ENCOUNTER FOR PREVENTIVE HEALTH EXAMINATION: Status: ACTIVE | Noted: 2023-01-17

## 2023-01-24 ENCOUNTER — APPOINTMENT (OUTPATIENT)
Dept: PHYSICAL MEDICINE AND REHAB | Facility: CLINIC | Age: 28
End: 2023-01-24
Payer: COMMERCIAL

## 2023-01-24 VITALS
HEIGHT: 63 IN | OXYGEN SATURATION: 100 % | TEMPERATURE: 98.5 F | DIASTOLIC BLOOD PRESSURE: 89 MMHG | BODY MASS INDEX: 35.44 KG/M2 | HEART RATE: 95 BPM | SYSTOLIC BLOOD PRESSURE: 129 MMHG | WEIGHT: 200 LBS

## 2023-01-24 DIAGNOSIS — M54.50 LOW BACK PAIN, UNSPECIFIED: ICD-10-CM

## 2023-01-24 DIAGNOSIS — Z98.890 OTHER SPECIFIED POSTPROCEDURAL STATES: ICD-10-CM

## 2023-01-24 DIAGNOSIS — M54.16 RADICULOPATHY, LUMBAR REGION: ICD-10-CM

## 2023-01-24 PROCEDURE — 99204 OFFICE O/P NEW MOD 45 MIN: CPT | Mod: GC

## 2023-01-24 RX ORDER — MELOXICAM 7.5 MG/1
7.5 TABLET ORAL
Qty: 28 | Refills: 0 | Status: ACTIVE | COMMUNITY
Start: 2023-01-24 | End: 1900-01-01

## 2023-01-24 NOTE — ASSESSMENT
[FreeTextEntry1] : Ms. LUL MUÑIZ is a 27 year old female who presents with chronic low back pain with history of laminectomy now with worsening pain with radicular component down her LLE. Denies any red flag signs. Will recommend:\par - MR L Spine with and without contrast given persistent radicular pain despite conservative management with NSAIDs\par - Start PT 2-3x/week for stretching, strengthening (especially of core muscles), ROM exercises, HEP and modalities PRN including myofascial release, moist heat \par - Mobic 7.5mg BID x 1 week, and then PRN thereafter. Patient advised on cardiac/gi/renal side effects. Patient encouraged to take medication with food and not with other NSAIDs.\par \par RTC in 4-5 weeks. Patient aware of red flag signs including any changes to their bowel/bladder control, groin numbness or new weakness. Patient knows to seek immediate attention by calling 911 or going to nearest ER if these symptoms appear.

## 2023-01-24 NOTE — HISTORY OF PRESENT ILLNESS
[FreeTextEntry1] : Ms. LUL MUÑIZ is a 27 year old female with hx of lumbar laminectomy in 2020 who presents with acute on chronic low back pain. \par \par Location: L>R lower back\par Onset: 10 yr hx of lower back pain with exacerbations 3-4 times per year, most recently 2-1/2 weeks ago when turning over in bed and she has been in bed for 2 weeks. Reports she had a a laminectomy (L4-L5?) in 2020 in [place]. Records not available for review at time of appointment.\par Provocation/Palliative: Worse with sitting, better with lying flat and standing.\par Quality: Sharp\par Radiation: Into both buttocks, down left thigh, lateral lower leg into ankle/foot/toes \par Severity: Currently 5/10, at worst 8/10\par Timing: intermittent\par \par Denies any associated numbness. Denies any associated leg weakness. Denies any loss of bowel/bladder control or any groin numbness.\par Previous medications trialed: Toradol, methocarbamol (not helpful), Advil (400mg 1-2x daily)\par Previous procedures relevant to complaint: lumbar laminectomy\par Conservative therapy tried?: HEP\par

## 2023-01-24 NOTE — PHYSICAL EXAM
[FreeTextEntry1] : PE:\par Constitutional: In NAD, calm and cooperative\par MSK (Back)\par 	Inspection: no gross swelling identified, 2 cm horizontal incision over left lower lumbar paraspinals\par 	Palpation: Tenderness of the bilateral lower lumbar paraspinals\par 	ROM: Pain with 20' lumbar flexion, no pain with extension\par 	Strength: 5/5 strength in bilateral lower extremities\par 	Reflexes: 2+ Patella reflex bilaterally, 2+ Achilles reflex bilaterally, negative clonus bilaterally\par 	Sensation: Intact to light touch in bilateral lower extremities except for decreased senastion to light touch over LLE in L5 distribution\par Special tests:\par SLR: equivocal on left, negative on right\par KATYA: negative\par FADIR: negative\par Facet loading: negative\par Able to heel and toe walk

## 2023-01-30 ENCOUNTER — APPOINTMENT (OUTPATIENT)
Dept: MRI IMAGING | Facility: CLINIC | Age: 28
End: 2023-01-30
Payer: COMMERCIAL

## 2023-01-30 PROCEDURE — A9585: CPT | Mod: JW

## 2023-01-30 PROCEDURE — 72158 MRI LUMBAR SPINE W/O & W/DYE: CPT

## 2023-01-30 NOTE — DISCHARGE NOTE OB - MEDICATION SUMMARY - MEDICATIONS TO TAKE
[Right] : right shoulder [There are no fractures, subluxations or dislocations. No significant abnormalities are seen] : There are no fractures, subluxations or dislocations. No significant abnormalities are seen [FreeTextEntry1] : xrays reviewed from 1/13/23 showing no acute fracture  I will START or STAY ON the medications listed below when I get home from the hospital:    acetaminophen 650 mg oral tablet, extended release  -- 1 tab(s) by mouth every 8 hours MDD:4  -- This product contains acetaminophen.  Do not use  with any other product containing acetaminophen to prevent possible liver damage.    -- Indication: For PAIN    ibuprofen 600 mg oral tablet  -- 1 tab(s) by mouth every 6 hours MDD:4  -- Do not take this drug if you are pregnant.  It is very important that you take or use this exactly as directed.  Do not skip doses or discontinue unless directed by your doctor.  May cause drowsiness or dizziness.  Obtain medical advice before taking any non-prescription drugs as some may affect the action of this medication.  Take with food or milk.    -- Indication: For PAIN    Prenatal Multivitamins with Folic Acid 1 mg oral tablet  -- 1 tab(s) by mouth once a day  -- Indication: For PREGNANCY

## 2023-02-13 ENCOUNTER — APPOINTMENT (OUTPATIENT)
Dept: PHYSICAL MEDICINE AND REHAB | Facility: CLINIC | Age: 28
End: 2023-02-13

## 2023-07-27 NOTE — OB PROVIDER LABOR PROGRESS NOTE - NS_STATION_OBGYN_ALL_OB_NU
-3
[FreeTextEntry1] : 17 yo , first gyn visits. She is sexually active and sometimes uses condoms.  She states her periods are sometimes not on time but do come monthly, no heavy bleeding, mild cramps. She would like to hear about ocp's/\par \par Going to [employer]. playing lax, business major\par \par She does not smoke, no known blood clotting disorders.\par GM(P)-breast ca over age 50
-3

## 2023-10-01 PROBLEM — M54.16 LUMBAR RADICULAR PAIN: Status: ACTIVE | Noted: 2023-01-24

## 2024-02-20 ENCOUNTER — EMERGENCY (EMERGENCY)
Facility: HOSPITAL | Age: 29
LOS: 1 days | Discharge: HOME | End: 2024-02-20
Attending: EMERGENCY MEDICINE
Payer: COMMERCIAL

## 2024-02-20 ENCOUNTER — TRANSCRIPTION ENCOUNTER (OUTPATIENT)
Age: 29
End: 2024-02-20

## 2024-02-20 VITALS
HEART RATE: 105 BPM | RESPIRATION RATE: 18 BRPM | WEIGHT: 199.3 LBS | SYSTOLIC BLOOD PRESSURE: 131 MMHG | DIASTOLIC BLOOD PRESSURE: 85 MMHG | OXYGEN SATURATION: 99 % | TEMPERATURE: 98 F

## 2024-02-20 DIAGNOSIS — Z98.890 OTHER SPECIFIED POSTPROCEDURAL STATES: Chronic | ICD-10-CM

## 2024-02-20 PROCEDURE — 99284 EMERGENCY DEPT VISIT MOD MDM: CPT

## 2024-02-20 RX ORDER — KETOROLAC TROMETHAMINE 30 MG/ML
30 SYRINGE (ML) INJECTION ONCE
Refills: 0 | Status: DISCONTINUED | OUTPATIENT
Start: 2024-02-20 | End: 2024-02-20

## 2024-02-20 RX ORDER — ACETAMINOPHEN 500 MG
650 TABLET ORAL ONCE
Refills: 0 | Status: COMPLETED | OUTPATIENT
Start: 2024-02-20 | End: 2024-02-20

## 2024-02-20 NOTE — ED PROVIDER NOTE - PROGRESS NOTE DETAILS
pt does not want xrays reperformed bc she said that she just had them done 2 wks ago. refusing xray reports mild improvement of symptoms after meds. pt is driving, will send muscle relaxant to pharmacy

## 2024-02-20 NOTE — ED ADULT TRIAGE NOTE - CHIEF COMPLAINT QUOTE
Ambulatory to ED with c/o R wrist pain. denies injury. states she has been following up with orthopedic. xrays neg outpt. set to have MRI in approx 2 weeks. tylenol at 1830. NVIT.

## 2024-02-20 NOTE — ED PROVIDER NOTE - CARE PROVIDER_API CALL
Mp Rosario  Orthopaedic Surgery  [address]  [place] [address]  Phone: [phone]  Fax: [phone]  Follow Up Time:

## 2024-02-20 NOTE — ED PROVIDER NOTE - ATTENDING APP SHARED VISIT CONTRIBUTION OF CARE
I, Eduin Dunbar, performed a face to face bedside interview with this patient regarding history of present illness, and completed an independent physical examination. I personally made/approved the management plan and take responsibility for the patient management. I have communicated the patient’s plan of care and disposition with the ACP.  29 year old rpesnets with 6 months of R wrist pain. Pain in first 3 digits, worse with extensive use of hand  Gen: NAD, well appearing  CV: RRR  Pul: CTA b/l  Abd: Soft, non-distended, non-tender  Neuro: no focal deficits  msk: FROM, +ttp over dorsum of wrist  Pt neurovasc intact, no sign of traum, bony injury or infection, suspect carpal tunnel

## 2024-02-20 NOTE — ED PROVIDER NOTE - OBJECTIVE STATEMENT
30 yo female with no pmhx presents with rt wrist pain x6 mo. Pt states she is an  and is constantly typing on her computer. States that she is having pain over the back of the wrist with pain radiating into the first 3 digits of the rt hand. Also reports some tingling sensation in the first 3 digits as well over the last 6 mo. States she saw an orthopedist 2.5 wks ago, had xrays performed, and was told it was nl. States she is supposed to f/u in 4 wks for reassessment. Denies appt for MRI, states that's what the f/u is for is to see if it's needed. States she was prescribed meloxicam for the wrist but hasn't been taking it. Pt reports the pain has been bearable however tonight around 8:30 pm, she went to  a coffee pot and got excruciating pain over the back of the rt wrist that radiates down to the first 3 fingers. Denies taking anything for pain. Denies fever, chills, body aches, dizziness, LOC, vision changes, cp, palpitations, sob, abd pain, n/v/c/d, rashes.

## 2024-02-20 NOTE — ED PROVIDER NOTE - CLINICAL SUMMARY MEDICAL DECISION MAKING FREE TEXT BOX
pain likely carpal tunnel given history and exam. neurovascularly intact, no fnd's. 30 yo female with no pmhx presents with rt wrist pain x6 mo. pain likely carpal tunnel given history and exam. neurovascularly intact, no fnd's. refusing xrays. improvement of symptoms after meds. ortho f/u explained strict return precautions explained.

## 2024-02-20 NOTE — ED PROVIDER NOTE - PHYSICAL EXAMINATION
Gen: No acute distress, non toxic  HEENT: Mucous membranes moist, pink conjunctivae, EOMI. PERRL. Airway patent   CV: RRR, nl s1/s2.  Resp: CTAB, normal rate and effort  GI: Abdomen soft, NT, ND. No rebound, no guarding  Neuro: A&O x4, MAEx4. 5/5 str ext x 4. Sensation intact, symmetric throughout. No fnd's  MSK: FROM UE b/l. +ttp over the mid-dorsal distal wrist. +ttp over the rt 1st MCP. tendons intact of rt hand. compartments soft/compressible.   Skin: No rashes. intact and perfused. cap refill <2sec  Vascular: Radial and ulnar pulses 2+ b/l.

## 2024-02-20 NOTE — ED PROVIDER NOTE - PATIENT PORTAL LINK FT
You can access the FollowMyHealth Patient Portal offered by [facility] by registering at the following website: [email or web address]. By joining [email or web address]’s FollowMyHealth portal, you will also be able to view your health information using other applications (apps) compatible with our system.

## 2024-02-21 PROCEDURE — 96372 THER/PROPH/DIAG INJ SC/IM: CPT

## 2024-02-21 PROCEDURE — 99283 EMERGENCY DEPT VISIT LOW MDM: CPT

## 2024-02-21 RX ORDER — METHOCARBAMOL 500 MG/1
2 TABLET, FILM COATED ORAL
Qty: 18 | Refills: 0
Start: 2024-02-21 | End: 2024-02-23

## 2024-02-21 RX ADMIN — Medication 30 MILLIGRAM(S): at 01:19

## 2024-02-21 RX ADMIN — Medication 30 MILLIGRAM(S): at 00:18

## 2024-02-21 RX ADMIN — Medication 650 MILLIGRAM(S): at 01:19

## 2024-02-21 RX ADMIN — Medication 650 MILLIGRAM(S): at 00:19

## 2024-02-21 RX ADMIN — Medication 50 MILLIGRAM(S): at 00:19

## 2024-02-21 NOTE — ED ADULT NURSE NOTE - NSFALLUNIVINTERV_ED_ALL_ED
Bed/Stretcher in lowest position, wheels locked, appropriate side rails in place/Call bell, personal items and telephone in reach/Instruct patient to call for assistance before getting out of bed/chair/stretcher/Non-slip footwear applied when patient is off stretcher/[place] to call system/Physically safe environment - no spills, clutter or unnecessary equipment/Purposeful proactive rounding/Room/bathroom lighting operational, light cord in reach

## 2024-02-21 NOTE — ED ADULT NURSE NOTE - OBJECTIVE STATEMENT
Pt 29 year old female c/o right wrist pain and swelling since sunday. "Pt states she uses mouse for work and typing and feels pain while typing and using her mouse for past few months. Pt noticed right hand swelling since sunday; pt denies numbness, tingling, loss of circulation to area.

## 2024-03-17 ENCOUNTER — EMERGENCY (EMERGENCY)
Facility: HOSPITAL | Age: 29
LOS: 1 days | Discharge: HOME | End: 2024-03-17
Attending: EMERGENCY MEDICINE
Payer: SELF-PAY

## 2024-03-17 VITALS
WEIGHT: 188.94 LBS | HEIGHT: 63 IN | DIASTOLIC BLOOD PRESSURE: 85 MMHG | OXYGEN SATURATION: 98 % | SYSTOLIC BLOOD PRESSURE: 124 MMHG | HEART RATE: 119 BPM | TEMPERATURE: 98 F | RESPIRATION RATE: 20 BRPM

## 2024-03-17 VITALS — HEART RATE: 96 BPM

## 2024-03-17 DIAGNOSIS — Z98.890 OTHER SPECIFIED POSTPROCEDURAL STATES: Chronic | ICD-10-CM

## 2024-03-17 LAB
RAPID RVP RESULT: SIGNIFICANT CHANGE UP
SARS-COV-2 RNA SPEC QL NAA+PROBE: SIGNIFICANT CHANGE UP

## 2024-03-17 PROCEDURE — 99283 EMERGENCY DEPT VISIT LOW MDM: CPT | Mod: 25

## 2024-03-17 PROCEDURE — 93005 ELECTROCARDIOGRAM TRACING: CPT

## 2024-03-17 PROCEDURE — 71046 X-RAY EXAM CHEST 2 VIEWS: CPT | Mod: 26

## 2024-03-17 PROCEDURE — 71046 X-RAY EXAM CHEST 2 VIEWS: CPT

## 2024-03-17 PROCEDURE — 0225U NFCT DS DNA&RNA 21 SARSCOV2: CPT

## 2024-03-17 PROCEDURE — 93010 ELECTROCARDIOGRAM REPORT: CPT

## 2024-03-17 PROCEDURE — 99284 EMERGENCY DEPT VISIT MOD MDM: CPT

## 2024-03-17 RX ORDER — ACETAMINOPHEN 500 MG
650 TABLET ORAL ONCE
Refills: 0 | Status: COMPLETED | OUTPATIENT
Start: 2024-03-17 | End: 2024-03-17

## 2024-03-17 RX ADMIN — Medication 100 MILLIGRAM(S): at 19:10

## 2024-03-17 RX ADMIN — Medication 650 MILLIGRAM(S): at 19:19

## 2024-03-17 NOTE — ED PROVIDER NOTE - CLINICAL SUMMARY MEDICAL DECISION MAKING FREE TEXT BOX
29-year-old female presenting with cough associated with shortness of breath.  EKG was independently reviewed,  sinus tachycardia rate 111, IN interval 26, QRS 82, QTc 437, no ST elevations or depressions, no signs of right heart strain.  Lungs clear to auscultation bilaterally.  Suspect viral upper respiratory infection, patient is PE RC negative, unlikely PE.  Plan to obtain chest x-ray, viral swab, Tessalon Perle and reassess. 29-year-old female presenting with cough associated with shortness of breath.  EKG was independently reviewed,  sinus tachycardia rate 111, DC interval 26, QRS 82, QTc 437, no ST elevations or depressions, no signs of right heart strain.  Lungs clear to auscultation bilaterally.  Suspect viral upper respiratory infection, patient is PE RC negative, unlikely PE.  Plan to obtain chest x-ray, viral swab, Tessalon Perle and reassess.    Jair Bertrand PA-C: PT with stable VS, no acute distress, non toxic appearing, tolerating PO in the ED, Pt with no acute findings well appearing tx at home with albuterol with good response, Pt cleared for dc home with continued supportive care, follow up to PCP, educated about when to return to the ED if needed. PT verbalizes that he understands all instructions and results. Pt informed that ED is open and available 24/7 365 days a yr, encouraged to return to the ED if they have any change in condition, or feel the need for revaluation.

## 2024-03-17 NOTE — ED PROVIDER NOTE - PHYSICAL EXAMINATION
Gen: NAD, AOx3, intermittently coughing  Head: NCAT  HEENT: oral mucosa moist, normal conjunctiva, oropharynx clear without exudate or erythema  Lung: CTAB, no respiratory distress, no wheezing, rales, rhonchi  CV: normal s1/s2, rrr, no murmurs, Normal perfusion, pulses 2+ throughout  Abd: soft, NTND  MSK: No edema, no visible deformities, full range of motion in all 4 extremities  Neuro: CN II-XII grossly intact, No focal neurologic deficits  Skin: No rash   Psych: normal affect
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
## 2024-03-17 NOTE — ED PROVIDER NOTE - PATIENT PORTAL LINK FT
You can access the FollowMyHealth Patient Portal offered by [facility] by registering at the following website: [email or web address]. By joining [employer]’s FollowMyHealth portal, you will also be able to view your health information using other applications (apps) compatible with our system.

## 2024-03-17 NOTE — ED PROVIDER NOTE - NSFOLLOWUPINSTRUCTIONS_ED_ALL_ED_FT
Patient education: Cough in adults (The Basics)  Written by the doctors and editors at [facility]  Please read the Disclaimer at the end of this page.    What is a cough?  A cough is an important reflex that helps clear out the body's airways. The airways include the windpipe, or "trachea," and the bronchi, which are the tubes that carry air within the lungs. Coughing helps keep people from breathing things into the airways and lungs, which could cause problems (figure 1).    It is normal for people to cough once in a while. But sometimes, a cough is a symptom of an illness or condition.    Some coughs are called "dry" coughs, because they don't bring up mucus (phlegm). Other coughs are called "wet" or "productive" coughs, because they do bring up mucus. Some coughs are mild and don't cause serious problems. Other coughs are severe and can cause trouble breathing.    What causes a cough?  In adults, common causes of a cough include:    ?Viral infections – These include the common cold, the flu, and COVID-19. Usually, a cough caused by a viral infection will only last for a week or 2, but sometimes, it can last longer.    ?Smoking cigarettes or vaping    ?Postnasal drip – Postnasal drip is when mucus from the nose drips down or flows along the back of the throat. Postnasal drip can happen when people have:    •A cold    •Allergies    •A sinus infection    ?Lung conditions – Lung problems like asthma and chronic obstructive pulmonary disease ("COPD") can make it hard to breathe. COPD is usually caused by smoking.    ?Acid reflux – Acid reflux is when the acid that is normally in your stomach backs up into your esophagus (the tube that carries food from your mouth to your stomach).    ?A side effect from blood pressure medicines called "ACE inhibitors"    Will I need tests?  Maybe. If you see a doctor for your cough, they will talk with you and do an exam. Based on your symptoms and other factors, they might decide that you need tests. These might include:    ?A swab from your inside of your nose – This can be tested for the virus that causes COVID-19.    ?A chest X-ray    ?Breathing tests – Breathing tests involve breathing hard into a tube. These tests show how your lungs are working.    ?Allergy skin tests to find out what you're allergic to – For a skin test, the doctor puts a drop of the substance that you might be allergic to on your skin and makes a tiny prick in your skin. Then, they watch your skin to see if it gets red and bumpy.    ?A CT scan of your chest or sinuses – A CT scan is an imaging test that creates pictures of the inside of the body.    ?Lab tests on a sample of the mucus that you cough up    ?Using a "scope" to look inside of your nose, sinuses, airway, or lungs    ?Tests to check for acid reflux – These usually involve having a thin tube put in your mouth and down into your esophagus.    How can I care for myself at home?    ?If your cough is from a cold, you can use a cool mist humidifier in your bedroom.    ?Suck on cough drops or hard candy.    ?Drink warm liquids, like tea, to soothe your throat.    ?Avoid smoking and places where other people are smoking.    ?If you have allergies, avoid the things that you are allergic to. This might include pollen, dust, animals, or mold.    ?If you are coughing up mucus, try an over-the-counter cold and cough medicine. These medicines can thin mucus and sometimes reduce the urge to cough.    ?If you have acid reflux, your doctor or nurse will talk to you about how to reduce symptoms.    How is a cough treated?  Treatment depends on the cause of your cough. For example:    ?Some infections are treated with antibiotic medicines. If an infection is caused by bacteria, doctors can treat it with antibiotics. If the infection is caused by a virus (such as the common cold), antibiotics will not help. For some viral infections, like the flu or COVID-19, there might be other medicines that can help.    ?Postnasal drip is treated with different kinds of medicines that can come as a pill or nose spray.    ?Asthma and COPD are usually treated with medicines that people breathe into their lungs. These are called "inhaler medicines."    ?Acid reflux can be treated with medicine to reduce or block stomach acid.    ?If you have a cough as a side effect from an ACE inhibitor, your doctor can switch your medicine.    If the cause of your cough is not clear, your doctor might prescribe medicine to make your cough less severe. But these medicines have side effects, and doctors usually recommend them only if nothing else has worked.    When should I call the doctor?  Call your doctor or nurse if:    ?You have trouble breathing or noisy breathing (wheezing).    ?You have a fever or chest pain.    ?You cough up blood, or yellow or green mucus.    ?You cough so hard that it makes you throw up.    ?Your cough gets worse or lasts longer than 14 days.    ?You have a cough and have lost weight without trying to.
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
## 2024-03-17 NOTE — ED PROVIDER NOTE - ADDITIONAL NOTES AND INSTRUCTIONS:
PT was evaluated At [facility] [place] ED and was found to have a condition that warranted time of to rest and heal from WORK/SCHOOL.   Jair Bertrand PA-C
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
## 2024-03-17 NOTE — ED PROVIDER NOTE - NS ED ATTENDING STATEMENT MOD
This was a shared visit with the ANDRES. I reviewed and verified the documentation. hard copy, drawn during this pregnancy

## 2024-03-17 NOTE — ED PROVIDER NOTE - ATTENDING APP SHARED VISIT CONTRIBUTION OF CARE
I, Leticia Garcia, performed a face to face bedside interview with this patient regarding history of present illness, review of symptoms and relevant past medical, social and family history.  I completed an independent physical examination. Medical decision making, follow-up on ordered tests (ie labs, radiologic studies) and re-evaluation of the patient's status has been communicated to the ACP.  Disposition of the patient will be based on test outcome and response to ED interventions.

## 2024-03-17 NOTE — ED ADULT NURSE NOTE - NSFALLUNIVINTERV_ED_ALL_ED
Bed/Stretcher in lowest position, wheels locked, appropriate side rails in place/Call bell, personal items and telephone in reach/Instruct patient to call for assistance before getting out of bed/chair/stretcher/Non-slip footwear applied when patient is off stretcher/[place] to call system/Physically safe environment - no spills, clutter or unnecessary equipment/Purposeful proactive rounding/Room/bathroom lighting operational, light cord in reach